# Patient Record
Sex: FEMALE | Race: OTHER | Employment: FULL TIME | URBAN - METROPOLITAN AREA
[De-identification: names, ages, dates, MRNs, and addresses within clinical notes are randomized per-mention and may not be internally consistent; named-entity substitution may affect disease eponyms.]

---

## 2017-11-14 ENCOUNTER — TELEPHONE (OUTPATIENT)
Dept: OBGYN CLINIC | Facility: CLINIC | Age: 32
End: 2017-11-14

## 2017-11-14 RX ORDER — ETONOGESTREL AND ETHINYL ESTRADIOL 11.7; 2.7 MG/1; MG/1
1 INSERT, EXTENDED RELEASE VAGINAL
Qty: 3 EACH | Refills: 5 | Status: SHIPPED | OUTPATIENT
Start: 2017-11-14 | End: 2018-11-29

## 2017-11-14 NOTE — TELEPHONE ENCOUNTER
Pt states if [facility] can prescribe another nuvaring because pt states since she stopped almost a year ago her periods have been very irregular. Please advise.

## 2018-07-17 ENCOUNTER — OFFICE VISIT (OUTPATIENT)
Dept: OBGYN CLINIC | Facility: CLINIC | Age: 33
End: 2018-07-17

## 2018-07-17 VITALS
SYSTOLIC BLOOD PRESSURE: 112 MMHG | DIASTOLIC BLOOD PRESSURE: 72 MMHG | HEIGHT: 64 IN | WEIGHT: 150.19 LBS | BODY MASS INDEX: 25.64 KG/M2

## 2018-07-17 DIAGNOSIS — Z11.3 SCREENING FOR STD (SEXUALLY TRANSMITTED DISEASE): Primary | ICD-10-CM

## 2018-07-17 DIAGNOSIS — Z01.419 ENCOUNTER FOR GYNECOLOGICAL EXAMINATION WITHOUT ABNORMAL FINDING: ICD-10-CM

## 2018-07-17 PROCEDURE — 99395 PREV VISIT EST AGE 18-39: CPT | Performed by: OBSTETRICS & GYNECOLOGY

## 2018-07-17 NOTE — PROGRESS NOTES
HPI:    Patient ID: Tiesha Parsons is a 28year old female. HPI  Patient here for routine exam.  Desires GC/Chlamydia Culture for her [facility] physician. Patient will be having IVF some time this year. LPS 2016, WNL with - HPV.       Review of Systems   Con gynecological examination without abnormal finding   F/U Labs. Encouraged monthly SBE. Discussed diet and exercise.      Orders Placed This Encounter      Chlamydia/GC PCR Combo    Meds This Visit:  No prescriptions requested or ordered in this encounter

## 2018-07-18 ENCOUNTER — TELEPHONE (OUTPATIENT)
Dept: OBGYN CLINIC | Facility: CLINIC | Age: 33
End: 2018-07-18

## 2018-07-18 LAB
C TRACH DNA SPEC QL NAA+PROBE: NEGATIVE
N GONORRHOEA DNA SPEC QL NAA+PROBE: NEGATIVE

## 2018-11-29 ENCOUNTER — TELEPHONE (OUTPATIENT)
Dept: OBGYN CLINIC | Facility: CLINIC | Age: 33
End: 2018-11-29

## 2018-11-29 RX ORDER — ETONOGESTREL AND ETHINYL ESTRADIOL 11.7; 2.7 MG/1; MG/1
1 INSERT, EXTENDED RELEASE VAGINAL
Qty: 3 EACH | Refills: 2 | Status: SHIPPED | OUTPATIENT
Start: 2018-11-29 | End: 2018-11-30

## 2018-11-29 NOTE — TELEPHONE ENCOUNTER
Pharmacy requesting a refill for pt's Nuva Ring. Pt was last seen in the office on 7/17/18.  Pls advise

## 2018-11-30 ENCOUNTER — TELEPHONE (OUTPATIENT)
Dept: OBGYN CLINIC | Facility: CLINIC | Age: 33
End: 2018-11-30

## 2018-11-30 RX ORDER — ETONOGESTREL AND ETHINYL ESTRADIOL 11.7; 2.7 MG/1; MG/1
INSERT, EXTENDED RELEASE VAGINAL
Qty: 3 EACH | Refills: 2 | OUTPATIENT
Start: 2018-11-30 | End: 2019-12-02

## 2018-11-30 NOTE — TELEPHONE ENCOUNTER
Irais called for directions on Nuvaring. Mk was advised pt is to place Nuvaring in vagina for 21 days and remove for 1 week.

## 2019-12-02 RX ORDER — ETONOGESTREL AND ETHINYL ESTRADIOL 11.7; 2.7 MG/1; MG/1
INSERT, EXTENDED RELEASE VAGINAL
Qty: 3 EACH | Refills: 2 | OUTPATIENT
Start: 2019-12-02

## 2019-12-02 RX ORDER — ETONOGESTREL AND ETHINYL ESTRADIOL 11.7; 2.7 MG/1; MG/1
INSERT, EXTENDED RELEASE VAGINAL
Qty: 3 EACH | Refills: 0 | Status: SHIPPED | OUTPATIENT
Start: 2019-12-02 | End: 2020-03-05

## 2019-12-02 NOTE — TELEPHONE ENCOUNTER
Refill request received.   RN routing to provider for consideration    BC type: Nuvaring  Pt last seen: 7/17/2018  Last pap: 9/26/16  Pap results: normal  Last refill: 11/29/18 - 9 month supply  Next appt scheduled: none

## 2020-02-26 RX ORDER — ETONOGESTREL AND ETHINYL ESTRADIOL VAGINAL .015; .12 MG/D; MG/D
RING VAGINAL
Refills: 0 | OUTPATIENT
Start: 2020-02-26

## 2020-03-05 ENCOUNTER — OFFICE VISIT (OUTPATIENT)
Dept: OBGYN CLINIC | Facility: CLINIC | Age: 35
End: 2020-03-05
Payer: COMMERCIAL

## 2020-03-05 VITALS
HEIGHT: 64 IN | BODY MASS INDEX: 25.38 KG/M2 | SYSTOLIC BLOOD PRESSURE: 114 MMHG | DIASTOLIC BLOOD PRESSURE: 76 MMHG | WEIGHT: 148.63 LBS

## 2020-03-05 DIAGNOSIS — Z01.419 ENCOUNTER FOR GYNECOLOGICAL EXAMINATION WITHOUT ABNORMAL FINDING: ICD-10-CM

## 2020-03-05 DIAGNOSIS — Z01.419 WELL WOMAN EXAM WITH ROUTINE GYNECOLOGICAL EXAM: Primary | ICD-10-CM

## 2020-03-05 PROCEDURE — 99395 PREV VISIT EST AGE 18-39: CPT | Performed by: OBSTETRICS & GYNECOLOGY

## 2020-03-05 RX ORDER — ETONOGESTREL AND ETHINYL ESTRADIOL 11.7; 2.7 MG/1; MG/1
INSERT, EXTENDED RELEASE VAGINAL
Qty: 3 RING | Refills: 3 | Status: SHIPPED | OUTPATIENT
Start: 2020-03-05 | End: 2021-02-24

## 2020-03-05 NOTE — PROGRESS NOTES
HPI:    Patient ID: Ranulfo Lainez is a 29year old female. Patient here for routine exam.  No C/Os. H/O Bilateral Salpingectomy but desires to continue with Nuvaring for cycle control. Think of going for a type of IVF.   Patient going to see [facility] to find and vitals reviewed. ASSESSMENT/PLAN:   Well woman exam with routine gynecological exam  (primary encounter diagnosis)  Encounter for gynecological examination without abnormal finding   F/U Labs. Encouraged monthly SBE.   Discussed diet and ex

## 2020-03-06 LAB — HPV I/H RISK 1 DNA SPEC QL NAA+PROBE: NEGATIVE

## 2020-11-24 ENCOUNTER — OFFICE VISIT (OUTPATIENT)
Dept: OBGYN CLINIC | Facility: CLINIC | Age: 35
End: 2020-11-24
Payer: COMMERCIAL

## 2020-11-24 VITALS — BODY MASS INDEX: 26 KG/M2 | WEIGHT: 153 LBS | DIASTOLIC BLOOD PRESSURE: 83 MMHG | SYSTOLIC BLOOD PRESSURE: 110 MMHG

## 2020-11-24 DIAGNOSIS — N64.4 MASTALGIA: Primary | ICD-10-CM

## 2020-11-24 PROCEDURE — 99213 OFFICE O/P EST LOW 20 MIN: CPT | Performed by: OBSTETRICS & GYNECOLOGY

## 2020-11-24 PROCEDURE — 3079F DIAST BP 80-89 MM HG: CPT | Performed by: OBSTETRICS & GYNECOLOGY

## 2020-11-24 PROCEDURE — 3074F SYST BP LT 130 MM HG: CPT | Performed by: OBSTETRICS & GYNECOLOGY

## 2020-11-24 NOTE — PROGRESS NOTES
HPI:   Claritza Anderson is a 29year old female who presents for a hx of right breast discomfort for 5 days. Pt does not recall any trauma/heavy lifting anytime recently. lmp approx 3 weeks ago. Pt counseled, advised to schedule dx mammogram/u/s.         Wt R dysuria, vaginal discharge or itching,periods regular   MUSCULOSKELETAL: denies back pain  NEURO: denies headaches  PSYCHE: denies depression or anxiety  HEMATOLOGIC: denies hx of anemia  ENDOCRINE: denies thyroid history  ALL/ASTHMA: denies hx of allergy

## 2020-12-04 ENCOUNTER — HOSPITAL ENCOUNTER (OUTPATIENT)
Dept: MAMMOGRAPHY | Facility: HOSPITAL | Age: 35
Discharge: HOME | End: 2020-12-04
Attending: OBSTETRICS & GYNECOLOGY
Payer: COMMERCIAL

## 2020-12-04 ENCOUNTER — HOSPITAL ENCOUNTER (OUTPATIENT)
Dept: ULTRASOUND IMAGING | Facility: HOSPITAL | Age: 35
Discharge: HOME | End: 2020-12-04
Attending: OBSTETRICS & GYNECOLOGY
Payer: COMMERCIAL

## 2020-12-04 DIAGNOSIS — N64.4 MASTALGIA: ICD-10-CM

## 2020-12-04 PROCEDURE — 77066 DX MAMMO INCL CAD BI: CPT | Performed by: OBSTETRICS & GYNECOLOGY

## 2020-12-04 PROCEDURE — 76642 ULTRASOUND BREAST LIMITED: CPT | Performed by: OBSTETRICS & GYNECOLOGY

## 2020-12-04 PROCEDURE — 77062 BREAST TOMOSYNTHESIS BI: CPT | Performed by: OBSTETRICS & GYNECOLOGY

## 2020-12-07 DIAGNOSIS — N64.4 MASTALGIA: Primary | ICD-10-CM

## 2021-02-11 ENCOUNTER — TELEPHONE (OUTPATIENT)
Dept: OBGYN CLINIC | Facility: CLINIC | Age: 36
End: 2021-02-11

## 2021-02-24 ENCOUNTER — TELEPHONE (OUTPATIENT)
Dept: OBGYN CLINIC | Facility: CLINIC | Age: 36
End: 2021-02-24

## 2021-02-24 RX ORDER — ETONOGESTREL AND ETHINYL ESTRADIOL 11.7; 2.7 MG/1; MG/1
INSERT, EXTENDED RELEASE VAGINAL
Qty: 3 RING | Refills: 0 | Status: SHIPPED | OUTPATIENT
Start: 2021-02-24 | End: 2021-05-21

## 2021-02-24 NOTE — TELEPHONE ENCOUNTER
Etonogestrel-Ethinyl Estradiol (NUVARING) 0.12-0.015 MG/24HR Vaginal Ring 3 ring 3 3/5/2020    Sig:   Place 1 ring vaginally for 3 weeks (21 days) then remove for 7.      Route:   (none)     Order #:   [id number]

## 2021-02-25 NOTE — TELEPHONE ENCOUNTER
Pharmacy is [phone] is saying that they have not received this prescription. Can we give them a verbal approval or resend? Please advise.       Etonogestrel-Ethinyl Estradiol (NUVARING) 0.12-0.015 MG/24HR Vaginal Ring

## 2021-05-21 ENCOUNTER — TELEPHONE (OUTPATIENT)
Dept: OBGYN CLINIC | Facility: CLINIC | Age: 36
End: 2021-05-21

## 2021-05-21 RX ORDER — ETONOGESTREL AND ETHINYL ESTRADIOL 11.7; 2.7 MG/1; MG/1
INSERT, EXTENDED RELEASE VAGINAL
Qty: 1 RING | Refills: 0 | Status: SHIPPED | OUTPATIENT
Start: 2021-05-21 | End: 2021-06-10

## 2021-05-21 NOTE — TELEPHONE ENCOUNTER
Patient cancelled her annual exam in March. May send in one [phone] but patient needs to schedule an appointment.

## 2021-05-21 NOTE — TELEPHONE ENCOUNTER
Pt Name and  verified. Patient informed and verbalized understanding.  Has apt scheduled for annual with [facility] on

## 2021-06-10 ENCOUNTER — OFFICE VISIT (OUTPATIENT)
Dept: OBGYN CLINIC | Facility: CLINIC | Age: 36
End: 2021-06-10
Payer: COMMERCIAL

## 2021-06-10 VITALS — DIASTOLIC BLOOD PRESSURE: 72 MMHG | WEIGHT: 157 LBS | BODY MASS INDEX: 27 KG/M2 | SYSTOLIC BLOOD PRESSURE: 126 MMHG

## 2021-06-10 DIAGNOSIS — Z01.419 ENCOUNTER FOR GYNECOLOGICAL EXAMINATION WITHOUT ABNORMAL FINDING: Primary | ICD-10-CM

## 2021-06-10 PROCEDURE — 3078F DIAST BP <80 MM HG: CPT | Performed by: OBSTETRICS & GYNECOLOGY

## 2021-06-10 PROCEDURE — 3074F SYST BP LT 130 MM HG: CPT | Performed by: OBSTETRICS & GYNECOLOGY

## 2021-06-10 PROCEDURE — 99395 PREV VISIT EST AGE 18-39: CPT | Performed by: OBSTETRICS & GYNECOLOGY

## 2021-06-10 RX ORDER — ETONOGESTREL AND ETHINYL ESTRADIOL 11.7; 2.7 MG/1; MG/1
INSERT, EXTENDED RELEASE VAGINAL
Qty: 3 RING | Refills: 3 | Status: SHIPPED | OUTPATIENT
Start: 2021-06-10

## 2021-06-10 NOTE — PROGRESS NOTES
HPI/Subjective:   Patient ID: Phil Bustos is a 28year old female. Patient here for routine exam.  No C/Os. Desires to continue with Nuvaring for cycle control. Patient does not smoke. H/O Bilateral Salpingectomy. LPS 3/2020, WNL with - HPV.   Next neck supple. Lymphadenopathy:      Cervical: No cervical adenopathy. Skin:     General: Skin is warm and dry. Neurological:      General: No focal deficit present. Mental Status: She is alert and oriented to person, place, and time.    Psychiatri

## 2022-05-18 RX ORDER — ETONOGESTREL AND ETHINYL ESTRADIOL VAGINAL .015; .12 MG/D; MG/D
RING VAGINAL
Qty: 3 RING | Refills: 0 | Status: SHIPPED | OUTPATIENT
Start: 2022-05-18 | End: 2022-06-27

## 2022-06-23 RX ORDER — ETONOGESTREL AND ETHINYL ESTRADIOL 11.7; 2.7 MG/1; MG/1
INSERT, EXTENDED RELEASE VAGINAL
Qty: 3 RING | Refills: 0 | OUTPATIENT
Start: 2022-06-23

## 2022-06-27 RX ORDER — ETONOGESTREL AND ETHINYL ESTRADIOL 11.7; 2.7 MG/1; MG/1
INSERT, EXTENDED RELEASE VAGINAL
Qty: 2 RING | Refills: 0 | Status: SHIPPED | OUTPATIENT
Start: 2022-06-27

## 2022-06-27 NOTE — TELEPHONE ENCOUNTER
Medication refilled to bridge patinet to upcoming appointment.     Future Appointments   Date Time Provider Roshni Verna   7/29/2022  9:20 AM MD MAXX Negron [facility]     No further action required

## 2022-08-25 RX ORDER — ETONOGESTREL AND ETHINYL ESTRADIOL VAGINAL .015; .12 MG/D; MG/D
RING VAGINAL
Refills: 0 | OUTPATIENT
Start: 2022-08-25

## 2022-09-14 RX ORDER — ETONOGESTREL AND ETHINYL ESTRADIOL 11.7; 2.7 MG/1; MG/1
INSERT, EXTENDED RELEASE VAGINAL
Qty: 2 RING | Refills: 0 | Status: SHIPPED | OUTPATIENT
Start: 2022-09-14

## 2022-10-19 ENCOUNTER — HOSPITAL ENCOUNTER (OUTPATIENT)
Age: 37
Discharge: HOME | End: 2022-10-19
Payer: COMMERCIAL

## 2022-10-19 VITALS
RESPIRATION RATE: 18 BRPM | HEART RATE: 101 BPM | OXYGEN SATURATION: 100 % | DIASTOLIC BLOOD PRESSURE: 98 MMHG | WEIGHT: 154 LBS | BODY MASS INDEX: 27.29 KG/M2 | SYSTOLIC BLOOD PRESSURE: 141 MMHG | TEMPERATURE: 98 F | HEIGHT: 63 IN

## 2022-10-19 DIAGNOSIS — Z20.822 ENCOUNTER FOR LABORATORY TESTING FOR COVID-19 VIRUS: Primary | ICD-10-CM

## 2022-10-19 DIAGNOSIS — J06.9 UPPER RESPIRATORY TRACT INFECTION, UNSPECIFIED TYPE: ICD-10-CM

## 2022-10-19 LAB — SARS-COV-2 RNA RESP QL NAA+PROBE: NOT DETECTED

## 2022-10-19 PROCEDURE — 99203 OFFICE O/P NEW LOW 30 MIN: CPT | Performed by: NURSE PRACTITIONER

## 2022-10-19 PROCEDURE — U0002 COVID-19 LAB TEST NON-CDC: HCPCS | Performed by: NURSE PRACTITIONER

## 2022-10-19 NOTE — ED INITIAL ASSESSMENT (HPI)
Pt reports chills, body aches, cough, nasal congestion which started Monday. Denies sore throat. Denies fevers. At home covid negative.

## 2022-11-04 ENCOUNTER — OFFICE VISIT (OUTPATIENT)
Dept: OBGYN CLINIC | Facility: CLINIC | Age: 37
End: 2022-11-04

## 2022-11-04 DIAGNOSIS — Z91.199 NO-SHOW FOR APPOINTMENT: Primary | ICD-10-CM

## 2022-11-04 PROCEDURE — 99998 NO SHOW: CPT | Performed by: OBSTETRICS & GYNECOLOGY

## 2023-01-24 ENCOUNTER — OFFICE VISIT (OUTPATIENT)
Dept: OBGYN CLINIC | Facility: CLINIC | Age: 38
End: 2023-01-24

## 2023-01-24 VITALS
BODY MASS INDEX: 27.04 KG/M2 | WEIGHT: 152.63 LBS | SYSTOLIC BLOOD PRESSURE: 141 MMHG | HEIGHT: 63 IN | DIASTOLIC BLOOD PRESSURE: 97 MMHG | HEART RATE: 59 BPM

## 2023-01-24 DIAGNOSIS — Z12.4 SCREENING FOR MALIGNANT NEOPLASM OF CERVIX: Primary | ICD-10-CM

## 2023-01-24 DIAGNOSIS — Z01.419 ENCOUNTER FOR GYNECOLOGICAL EXAMINATION WITHOUT ABNORMAL FINDING: ICD-10-CM

## 2023-01-24 PROCEDURE — 3008F BODY MASS INDEX DOCD: CPT | Performed by: OBSTETRICS & GYNECOLOGY

## 2023-01-24 PROCEDURE — 99395 PREV VISIT EST AGE 18-39: CPT | Performed by: OBSTETRICS & GYNECOLOGY

## 2023-01-24 PROCEDURE — 3077F SYST BP >= 140 MM HG: CPT | Performed by: OBSTETRICS & GYNECOLOGY

## 2023-01-24 PROCEDURE — 3080F DIAST BP >= 90 MM HG: CPT | Performed by: OBSTETRICS & GYNECOLOGY

## 2023-01-25 LAB — HPV I/H RISK 1 DNA SPEC QL NAA+PROBE: NEGATIVE

## 2024-03-22 ENCOUNTER — OFFICE VISIT (OUTPATIENT)
Dept: OBGYN CLINIC | Facility: CLINIC | Age: 39
End: 2024-03-22
Payer: COMMERCIAL

## 2024-03-22 VITALS
WEIGHT: 127 LBS | DIASTOLIC BLOOD PRESSURE: 92 MMHG | HEIGHT: 63 IN | BODY MASS INDEX: 22.5 KG/M2 | SYSTOLIC BLOOD PRESSURE: 126 MMHG

## 2024-03-22 DIAGNOSIS — Z12.4 ENCOUNTER FOR PAPANICOLAOU SMEAR FOR CERVICAL CANCER SCREENING: ICD-10-CM

## 2024-03-22 DIAGNOSIS — Z01.419 ENCOUNTER FOR GYNECOLOGICAL EXAMINATION WITHOUT ABNORMAL FINDING: Primary | ICD-10-CM

## 2024-03-22 PROCEDURE — 3008F BODY MASS INDEX DOCD: CPT | Performed by: OBSTETRICS & GYNECOLOGY

## 2024-03-22 PROCEDURE — 3080F DIAST BP >= 90 MM HG: CPT | Performed by: OBSTETRICS & GYNECOLOGY

## 2024-03-22 PROCEDURE — 99395 PREV VISIT EST AGE 18-39: CPT | Performed by: OBSTETRICS & GYNECOLOGY

## 2024-03-22 PROCEDURE — 3074F SYST BP LT 130 MM HG: CPT | Performed by: OBSTETRICS & GYNECOLOGY

## 2024-03-22 NOTE — PROGRESS NOTES
HPI:   Ana Cary is a 38 year old female who presents for an annual/pap.       Wt Readings from Last 6 Encounters:   24 127 lb (57.6 kg)   23 157 lb 6.4 oz (71.4 kg)   23 152 lb 9.6 oz (69.2 kg)   10/19/22 154 lb (69.9 kg)   06/10/21 157 lb (71.2 kg)   20 153 lb (69.4 kg)     Body mass index is 22.5 kg/m².     CHOLESTEROL, TOTAL (mg/dL)   Date Value   2023 209 (H)   2020 214 (H)     HDL CHOLESTEROL (mg/dL)   Date Value   2023 77   2020 83     LDL-CHOLESTEROL (mg/dL (calc))   Date Value   2023 107 (H)   2020 99     AST (U/L)   Date Value   2023 18   2020 15     ALT (U/L)   Date Value   2023 16   2020 11        Current Outpatient Medications   Medication Sig Dispense Refill    ELURYNG 0.12-0.015 MG/24HR Vaginal Ring INSERT 1 RING VAGINALLY FOR 3 WEEKS(21 DAYS), THEN REMOVE FOR 7 DAYS 3 each 3      Past Medical History:   Diagnosis Date    Extrinsic asthma, unspecified       Past Surgical History:   Procedure Laterality Date      ,       Family History   Problem Relation Age of Onset    Diabetes Father     High Cholesterol Father     Hypertension Other         grandmother      Social History:   Social History     Socioeconomic History    Marital status:    Tobacco Use    Smoking status: Never    Smokeless tobacco: Never   Substance and Sexual Activity    Alcohol use: Yes     Comment: occasionally    Drug use: No   Other Topics Concern    Caffeine Concern Yes     Comment: coffee, 1 cup/day            REVIEW OF SYSTEMS:   GENERAL: feels well otherwise  SKIN: denies any unusual skin lesions  EYES:denies blurred vision or double vision  HEENT: denies nasal congestion, sinus pain or ST  LUNGS: denies shortness of breath with exertion  CARDIOVASCULAR: denies chest pain on exertion  GI: denies abdominal pain,denies heartburn  : denies dysuria, vaginal discharge or itching,periods regular   MUSCULOSKELETAL: denies back  pain  NEURO: denies headaches  PSYCHE: denies depression or anxiety  HEMATOLOGIC: denies hx of anemia  ENDOCRINE: denies thyroid history  ALL/ASTHMA: denies hx of allergy or asthma    EXAM:   BP (!) 126/92   Ht 5' 3\" (1.6 m)   Wt 127 lb (57.6 kg)   LMP 03/10/2024   Breastfeeding No   BMI 22.50 kg/m²   Body mass index is 22.5 kg/m².  Repeat bp pending.   If repeat elevated, will need to stop birth control ring  GENERAL: well developed, well nourished,in no apparent distress  SKIN: no rashes,no suspicious lesions  HEENT: atraumatic, normocephalic  EYES:normal in appearance  NECK: supple,no adenopathy  CHEST: no chest tenderness  BREAST: no dominant or suspicious mass  LUNGS: clear to auscultation  CARDIO: RRR without murmur  GI: good BS's,no masses, HSM or tenderness  :introitus is normal,scant discharge,cervix is pink,no adnexal masses or tenderness, PAP was done     MUSCULOSKELETAL: back is not tender,FROM of the back  EXTREMITIES: no cyanosis, clubbing or edema  NEURO: Oriented times three      ASSESSMENT AND PLAN:   Ana Cary is a 38 year old female who presents for an annual/pap   . Self breast exam explained. Health maintenance. Body mass index is 22.5 kg/m²., recommended low fat diet and aerobic exercise 30 minutes three times weekly.  The patient indicates understanding of these issues and agrees to the plan.  The patient is asked to return for an annual visit.

## 2024-03-24 RX ORDER — ETONOGESTREL AND ETHINYL ESTRADIOL VAGINAL RING .015; .12 MG/D; MG/D
1 RING VAGINAL
Qty: 3 EACH | Refills: 3 | Status: SHIPPED | OUTPATIENT
Start: 2024-03-24 | End: 2024-03-24

## 2024-03-25 LAB — HPV I/H RISK 1 DNA SPEC QL NAA+PROBE: NEGATIVE

## 2024-03-26 ENCOUNTER — TELEPHONE (OUTPATIENT)
Dept: OBGYN CLINIC | Facility: CLINIC | Age: 39
End: 2024-03-26

## 2024-03-26 NOTE — TELEPHONE ENCOUNTER
Call pt to recheck bp in office , pt is requesting nuvaring,  but do not recommend nuvaring with hypertension/elevated bp.

## 2024-03-26 NOTE — TELEPHONE ENCOUNTER
Patient verified name and     Patient informed of recommendations. Currently out of town but requesting appt next week. Scheduled  for BP check. Aware of scheduling details.

## 2024-04-19 ENCOUNTER — TELEPHONE (OUTPATIENT)
Dept: OBGYN CLINIC | Facility: CLINIC | Age: 39
End: 2024-04-19

## 2024-04-19 NOTE — TELEPHONE ENCOUNTER
----- Message from Aditya Hilliard MD sent at 4/19/2024 11:55 AM CDT -----  Need repeat pap smear

## 2024-04-19 NOTE — TELEPHONE ENCOUNTER
Called pt to inform pap was unsatisfactory not enough cells to run and need to repeat pap smear in office.   Left vm

## 2024-11-18 ENCOUNTER — OFFICE VISIT (OUTPATIENT)
Dept: ALLERGY | Facility: CLINIC | Age: 39
End: 2024-11-18

## 2024-11-18 ENCOUNTER — NURSE ONLY (OUTPATIENT)
Dept: ALLERGY | Facility: CLINIC | Age: 39
End: 2024-11-18

## 2024-11-18 DIAGNOSIS — T48.5X5A RHINITIS MEDICAMENTOSA: ICD-10-CM

## 2024-11-18 DIAGNOSIS — J30.2 SEASONAL AND PERENNIAL ALLERGIC RHINOCONJUNCTIVITIS: Primary | ICD-10-CM

## 2024-11-18 DIAGNOSIS — Z23 FLU VACCINE NEED: Primary | ICD-10-CM

## 2024-11-18 DIAGNOSIS — H10.10 SEASONAL AND PERENNIAL ALLERGIC RHINOCONJUNCTIVITIS: Primary | ICD-10-CM

## 2024-11-18 DIAGNOSIS — J30.2 SEASONAL AND PERENNIAL ALLERGIC RHINOCONJUNCTIVITIS: ICD-10-CM

## 2024-11-18 DIAGNOSIS — Z23 NEED FOR COVID-19 VACCINE: ICD-10-CM

## 2024-11-18 DIAGNOSIS — J30.89 SEASONAL AND PERENNIAL ALLERGIC RHINOCONJUNCTIVITIS: ICD-10-CM

## 2024-11-18 DIAGNOSIS — J30.89 SEASONAL AND PERENNIAL ALLERGIC RHINOCONJUNCTIVITIS: Primary | ICD-10-CM

## 2024-11-18 DIAGNOSIS — H10.10 SEASONAL AND PERENNIAL ALLERGIC RHINOCONJUNCTIVITIS: ICD-10-CM

## 2024-11-18 DIAGNOSIS — J31.0 RHINITIS MEDICAMENTOSA: ICD-10-CM

## 2024-11-18 PROCEDURE — 95024 IQ TESTS W/ALLERGENIC XTRCS: CPT | Performed by: ALLERGY & IMMUNOLOGY

## 2024-11-18 PROCEDURE — 95004 PERQ TESTS W/ALRGNC XTRCS: CPT | Performed by: ALLERGY & IMMUNOLOGY

## 2024-11-18 RX ORDER — AZELASTINE 1 MG/ML
2 SPRAY, METERED NASAL DAILY
Qty: 3 EACH | Refills: 0 | Status: SHIPPED | OUTPATIENT
Start: 2024-11-18

## 2024-11-18 RX ORDER — PREDNISONE 10 MG/1
TABLET ORAL
Qty: 15 TABLET | Refills: 0 | Status: SHIPPED | OUTPATIENT
Start: 2024-11-18

## 2024-11-18 RX ORDER — ETONOGESTREL/ETHINYL ESTRADIOL .12-.015MG
1 RING, VAGINAL VAGINAL
COMMUNITY
Start: 2024-06-30

## 2024-11-18 NOTE — PROGRESS NOTES
Ana Cary is a 38 year old female.    HPI:     Chief Complaint   Patient presents with    Allergies     Worsening allergy symptoms over this past year. Symptoms include nasal congestion, mouth breathing, dry throat. Symptoms are worse in the summer. No antihistamines within the last 5 days       Patient is a 38-year-old female who presents for allergy evaluation with a chief complaint of allergies    Immunizations reviewed.  COVID-vaccine x 2 doses last in 2021  No flu vaccine on record      Today patient reports       Allergies   Duration: this past year   Timing: Worsens seasonally.  Especially the summer  Symptoms:   Nasal congestion mouth breathing dry throat, runny nose sz   Severity: Moderate  Status: No change to worsening  Triggers: Allergies  Tried: afrin (active),  zyrtec prn , zyrtec-d , flonase prn   Pets : 1dog., 1 cat  Nonsmoker      Hx of asthma, ad, or food allergy:    History of asthma as child .  Denies current symptoms.  Denies symptoms more than 2 days/week  No ED visits or prednisone over the past year  Albuterol as needed nasal congestion mouth breathing dry throat    Defers flu  vaccine      HISTORY:  Past Medical History:    Extrinsic asthma, unspecified      Past Surgical History:   Procedure Laterality Date      ,       Family History   Problem Relation Age of Onset    Diabetes Father     High Cholesterol Father     Hypertension Other         grandmother      Social History:   Social History     Socioeconomic History    Marital status:    Tobacco Use    Smoking status: Never    Smokeless tobacco: Never   Vaping Use    Vaping status: Never Used   Substance and Sexual Activity    Alcohol use: Yes     Comment: occasionally    Drug use: No   Other Topics Concern    Caffeine Concern Yes     Comment: coffee, 1 cup/day        Medications (Active prior to today's visit):  Current Outpatient Medications   Medication Sig Dispense Refill    NUVARING 0.12-0.015 MG/24HR  Vaginal Ring Place 1 Ring vaginally every 28 days.      predniSONE 10 MG Oral Tab Take 3 tabs(30 mg) with food once a day x 5 days 15 tablet 0    azelastine 0.1 % Nasal Solution 2 sprays by Nasal route daily. 3 each 0       Allergies:  Allergies[1]      ROS:     Allergic/Immuno:  See HPI  Cardiovascular:  Negative for irregular heartbeat/palpitations, chest pain, edema  Constitutional:  Negative night sweats,weight loss, irritability and lethargy  Endocrine:  Negative for cold intolerance, polydipsia and polyphagia  ENMT:  Negative for ear drainage, hearing loss  see hpi   Eyes:  Negative for eye discharge and vision loss  Gastrointestinal:  Negative for abdominal pain,   Genitourinary:  Negative for dysuria and hematuria  Hema/Lymph:  Negative for easy bleeding and easy bruising  Integumentary:  Negative for pruritus and rash  Musculoskeletal:  Negative for joint symptoms  Neurological:  Negative for dizziness, seizures  Psychiatric:  Negative for inappropriate interaction and psychiatric symptoms  Respiratory:  Negative for cough, dyspnea and wheezing      PHYSICAL EXAM:   Constitutional: responsive, no acute distress noted  Head/Face: NC/Atraumatic  Eyes/Vision: conjunctiva and lids are normal extraocular motion is intact   Ears/Audiometry: tympanic membranes are normal bilaterally hearing is grossly intact  Nose/Mouth/Throat: nose and throat are clear mucous membranes are moist   Neck/Thyroid: neck is supple without adenopathy  Lymphatic: no abnormal cervical, supraclavicular or axillary adenopathy is noted  Respiratory: normal to inspection lungs are clear to auscultation bilaterally normal respiratory effort   Cardiovascular: regular rate and rhythm no murmurs, gallups, or rubs  Abdomen: soft non-tender non-distended  Skin/Hair: no unusual rashes present  Extremities: no edema, cyanosis, or clubbing  Neurological:Oriented to time, place, person & situation       ASSESSMENT/PLAN:   Assessment   Encounter  Diagnoses   Name Primary?    Flu vaccine need Yes    Need for COVID-19 vaccine     Seasonal and perennial allergic rhinoconjunctivitis     Rhinitis medicamentosa        Skin testing today to common indoor and outdoor environmental allergy was + to grass, mod, dm , cat dog cr, weeds  Positive histamine control    #1 allergic rhinitis  See above skin testing to screen for allergic triggers  Reviewed avoidance measures and potential treatment option immunotherapy  Trial of Astelin nasal spray 2 sprays per nostril up to twice a day as needed for symptoms.  May add Singulair or montelukast if congestion persist  Reviewed to quit Afrin cold turkey as she does have a component of rhinitis medicamentosa.    #2 rhinitis medicamentosa.  Chronic Afrin use is.  Recommend to quit cold turkey.  Start prednisone 30 mg once a day with food x 5 days  Astelin nasal spray as an antihistamine nasal spray 2 sprays per nostril twice a day.  May add Zyrtec-D as needed  Consider Singulair if refractory    #3 flu vaccine recommended and offered    4.  COVID-vaccine booster recommended.  Please check with local pharmacy as we do not stock the vaccine.  Most recent booster since 2024         Orders This Visit:  No orders of the defined types were placed in this encounter.      Meds This Visit:  Requested Prescriptions     Signed Prescriptions Disp Refills    predniSONE 10 MG Oral Tab 15 tablet 0     Sig: Take 3 tabs(30 mg) with food once a day x 5 days    azelastine 0.1 % Nasal Solution 3 each 0     Si sprays by Nasal route daily.       Imaging & Referrals:  None     2024  Sam Maldonado MD      If medication samples were provided today, they were provided solely for patient education and training related to self administration of these medications.  Teaching, instruction and sample was provided to the patient by myself.  Teaching included  a review of potential adverse side effects as well as potential efficacy.   Patient's questions were answered in regards to medication administration and dosing and potential side effects. Teaching was provided via the teach back method         [1] No Known Allergies

## 2024-11-18 NOTE — PATIENT INSTRUCTIONS
#1 allergic rhinitis  See above skin testing to screen for allergic triggers  Reviewed avoidance measures and potential treatment option immunotherapy  Trial of Astelin nasal spray 2 sprays per nostril up to twice a day as needed for symptoms.  May add Singulair or montelukast if congestion persist  Reviewed to quit Afrin cold turkey as she does have a component of rhinitis medicamentosa.    #2 rhinitis medicamentosa.  Chronic Afrin use is.  Recommend to quit cold turkey.  Start prednisone 30 mg once a day with food x 5 days  Astelin nasal spray as an antihistamine nasal spray 2 sprays per nostril twice a day.  May add Zyrtec-D as needed  Consider Singulair if refractory    #3 flu vaccine recommended and offered    4.  COVID-vaccine booster recommended.  Please check with local pharmacy as we do not stock the vaccine.  Most recent booster since 2024         Orders This Visit:  No orders of the defined types were placed in this encounter.      Meds This Visit:  Requested Prescriptions     Signed Prescriptions Disp Refills    predniSONE 10 MG Oral Tab 15 tablet 0     Sig: Take 3 tabs(30 mg) with food once a day x 5 days    azelastine 0.1 % Nasal Solution 3 each 0     Si sprays by Nasal route daily.

## 2024-12-12 ENCOUNTER — HOSPITAL ENCOUNTER (OUTPATIENT)
Age: 39
Discharge: HOME | End: 2024-12-12
Payer: COMMERCIAL

## 2024-12-12 VITALS
OXYGEN SATURATION: 100 % | DIASTOLIC BLOOD PRESSURE: 92 MMHG | HEART RATE: 75 BPM | TEMPERATURE: 98 F | RESPIRATION RATE: 18 BRPM | SYSTOLIC BLOOD PRESSURE: 149 MMHG

## 2024-12-12 DIAGNOSIS — J02.9 VIRAL PHARYNGITIS: Primary | ICD-10-CM

## 2024-12-12 LAB — S PYO AG THROAT QL: NEGATIVE

## 2024-12-12 PROCEDURE — 87081 CULTURE SCREEN ONLY: CPT

## 2024-12-12 NOTE — DISCHARGE INSTRUCTIONS
Salt water gargles.  Push fluids.  Rest.  Tylenol or ibuprofen as needed for pain or fever.  Follow-up as needed with your doctor if your symptoms persist or worsen.  Return for any concerns.

## 2024-12-12 NOTE — ED PROVIDER NOTES
He    Patient Seen in: [facility]      History     Chief Complaint   Patient presents with    Sore Throat     Stated Complaint: Sore Throat  Subjective:   39-year-old healthy female presents for 3 days of a sore throat.  She has had some bodyaches.  No difficulty swallowing.  Speech is clear.  No cough or congestion.  No fevers or chills.  No known exposure to sick contacts.  She is eating and drinking normally without vomiting or diarrhea.  No neck stiffness.  No rashes.  No headaches.  No dizziness.  She appears well and nontoxic.      Objective:   Past Medical History:    Extrinsic asthma, unspecified            Past Surgical History:   Procedure Laterality Date      ,               Social History     Socioeconomic History    Marital status:    Tobacco Use    Smoking status: Never    Smokeless tobacco: Never   Vaping Use    Vaping status: Never Used   Substance and Sexual Activity    Alcohol use: Yes     Comment: occasionally    Drug use: No   Other Topics Concern    Caffeine Concern Yes     Comment: coffee, 1 cup/day            Review of Systems    Positive for stated complaint: Sore Throat     Other systems are as noted in HPI.  Constitutional and vital signs reviewed.      All other systems reviewed and negative except as noted above.    Physical Exam     ED Triage Vitals [24 1603]   BP (!) 149/92   Pulse 75   Resp 18   Temp 97.9 °F (36.6 °C)   Temp src Oral   SpO2 100 %   O2 Device None (Room air)     Current:BP (!) 149/92   Pulse 75   Temp 97.9 °F (36.6 °C) (Oral)   Resp 18   LMP 03/10/2024   SpO2 100%     Physical Exam  Vitals and nursing note reviewed.   Constitutional:       General: She is not in acute distress.     Appearance: She is well-developed. She is not toxic-appearing.   HENT:      Head: Normocephalic.      Right Ear: Tympanic membrane normal.      Left Ear: Tympanic membrane normal.      Nose: No congestion or rhinorrhea.      Mouth/Throat:      Mouth:  Mucous membranes are moist. No oral lesions.      Pharynx: Oropharynx is clear. Uvula midline. Posterior oropharyngeal erythema present. No pharyngeal swelling, oropharyngeal exudate or uvula swelling.      Tonsils: No tonsillar exudate.   Eyes:      Conjunctiva/sclera: Conjunctivae normal.   Cardiovascular:      Rate and Rhythm: Normal rate and regular rhythm.   Pulmonary:      Effort: Pulmonary effort is normal.      Breath sounds: Normal breath sounds.   Musculoskeletal:      Cervical back: Normal range of motion and neck supple.   Lymphadenopathy:      Cervical: No cervical adenopathy.   Skin:     General: Skin is warm and dry.      Capillary Refill: Capillary refill takes less than 2 seconds.      Findings: No rash.   Neurological:      General: No focal deficit present.      Mental Status: She is alert and oriented to person, place, and time.   Psychiatric:         Mood and Affect: Mood normal.         Behavior: Behavior normal.         ED Course   No results found.  Labs Reviewed   POCT RAPID STREP - Normal   GRP A STREP CULT, THROAT       MDM     Medical Decision Making  The strep test is negative.  The patient is aware.  Her symptoms are most likely viral.  We discussed supportive care including salt water gargles, pushing fluids, rest, and Tylenol or Motrin as needed for pain or fever.  She will follow-up as needed with her primary care doctor if her symptoms persist or worsen.    Amount and/or Complexity of Data Reviewed  Labs: ordered.     Details: Strep negative    Risk  OTC drugs.  Risk Details: Strep throat versus viral pharyngitis        Disposition and Plan     Clinical Impression:  1. Viral pharyngitis         Disposition:  Discharge  12/12/2024  4:37 pm    Follow-up:  John Esparza MD  [address]  [place] [address]  [phone]    Schedule an appointment as soon as possible for a visit   As needed          Medications Prescribed:  Current Discharge Medication List

## 2024-12-12 NOTE — ED INITIAL ASSESSMENT (HPI)
Pt complaining of sore throat since Monday with body aches and congestion. No fever. No cough. Negative covid test at home. No known sick contacts,

## 2025-04-28 ENCOUNTER — TELEPHONE (OUTPATIENT)
Dept: OBGYN CLINIC | Facility: CLINIC | Age: 40
End: 2025-04-28

## 2025-04-29 ENCOUNTER — OFFICE VISIT (OUTPATIENT)
Dept: OBGYN CLINIC | Facility: CLINIC | Age: 40
End: 2025-04-29
Payer: COMMERCIAL

## 2025-04-29 VITALS
DIASTOLIC BLOOD PRESSURE: 87 MMHG | HEIGHT: 63 IN | SYSTOLIC BLOOD PRESSURE: 122 MMHG | BODY MASS INDEX: 23.04 KG/M2 | WEIGHT: 130 LBS

## 2025-04-29 DIAGNOSIS — Z01.419 ENCOUNTER FOR GYNECOLOGICAL EXAMINATION WITHOUT ABNORMAL FINDING: Primary | ICD-10-CM

## 2025-04-29 PROCEDURE — 3079F DIAST BP 80-89 MM HG: CPT | Performed by: OBSTETRICS & GYNECOLOGY

## 2025-04-29 PROCEDURE — 3074F SYST BP LT 130 MM HG: CPT | Performed by: OBSTETRICS & GYNECOLOGY

## 2025-04-29 PROCEDURE — 99395 PREV VISIT EST AGE 18-39: CPT | Performed by: OBSTETRICS & GYNECOLOGY

## 2025-04-29 PROCEDURE — 3008F BODY MASS INDEX DOCD: CPT | Performed by: OBSTETRICS & GYNECOLOGY

## 2025-04-29 RX ORDER — ETONOGESTREL/ETHINYL ESTRADIOL .12-.015MG
1 RING, VAGINAL VAGINAL
Qty: 3 RING | Refills: 5 | Status: SHIPPED | OUTPATIENT
Start: 2025-04-29

## 2025-04-29 NOTE — PROGRESS NOTES
HPI:   Ana Cary is a 39 year old female who presents for an annual/pap.     Wt Readings from Last 6 Encounters:   04/29/25 130 lb (59 kg)   03/22/24 127 lb (57.6 kg)   01/27/23 157 lb 6.4 oz (71.4 kg)   01/24/23 152 lb 9.6 oz (69.2 kg)   10/19/22 154 lb (69.9 kg)   06/10/21 157 lb (71.2 kg)     Body mass index is 23.03 kg/m².    CHOLESTEROL, TOTAL (mg/dL)   Date Value   01/27/2023 209 (H)   08/31/2020 214 (H)     HDL CHOLESTEROL (mg/dL)   Date Value   01/27/2023 77   08/31/2020 83     LDL-CHOLESTEROL (mg/dL (calc))   Date Value   01/27/2023 107 (H)   08/31/2020 99     AST (U/L)   Date Value   01/27/2023 18   08/31/2020 15     ALT (U/L)   Date Value   01/27/2023 16   08/31/2020 11        Current Medications[1]   Past Medical History[2]   Past Surgical History[3]   Family History[4]   Social History:   Short Social Hx on File[5]         REVIEW OF SYSTEMS:   GENERAL: feels well otherwise  SKIN: denies any unusual skin lesions  EYES:denies blurred vision or double vision  HEENT: denies nasal congestion, sinus pain or ST  LUNGS: denies shortness of breath with exertion  CARDIOVASCULAR: denies chest pain on exertion  GI: denies abdominal pain,denies heartburn  : denies dysuria, vaginal discharge or itching,periods regular   MUSCULOSKELETAL: denies back pain  NEURO: denies headaches  PSYCHE: denies depression or anxiety  HEMATOLOGIC: denies hx of anemia  ENDOCRINE: denies thyroid history  ALL/ASTHMA: denies hx of allergy or asthma    EXAM:   /87   Ht 5' 3\" (1.6 m)   Wt 130 lb (59 kg)   LMP 04/27/2025 (Exact Date)   BMI 23.03 kg/m²   Body mass index is 23.03 kg/m².   GENERAL: well developed, well nourished,in no apparent distress  SKIN: no rashes,no suspicious lesions  HEENT: atraumatic, normocephalic  EYES:normal in appearance  NECK: supple,no adenopathy  CHEST: no chest tenderness  BREAST: no dominant or suspicious mass  LUNGS: clear to auscultation  CARDIO: RRR without murmur  GI: good BS's,no masses, HSM  or tenderness  :introitus is normal,scant discharge,cervix is pink,no adnexal masses or tenderness, PAP was done     MUSCULOSKELETAL: back is not tender,FROM of the back  EXTREMITIES: no cyanosis, clubbing or edema  NEURO: Oriented times three      ASSESSMENT AND PLAN:   Ana Cary is a 39 year old female who presents for an annual/pap.   . Self breast exam explained. Health maintenance. Body mass index is 23.03 kg/m²., recommended low fat diet and aerobic exercise 30 minutes three times weekly.  The patient indicates understanding of these issues and agrees to the plan.  The patient is asked to return for an annual visit.           [1]   Current Outpatient Medications   Medication Sig Dispense Refill    NUVARING 0.12-0.015 MG/24HR Vaginal Ring Place 1 Ring vaginally every 28 days.      predniSONE 10 MG Oral Tab Take 3 tabs(30 mg) with food once a day x 5 days 15 tablet 0    azelastine 0.1 % Nasal Solution 2 sprays by Nasal route daily. 3 each 0   [2]   Past Medical History:   Extrinsic asthma, unspecified   [3]   Past Surgical History:  Procedure Laterality Date      ,    [4]   Family History  Problem Relation Age of Onset    Diabetes Father     High Cholesterol Father     Hypertension Other         grandmother   [5]   Social History  Socioeconomic History    Marital status:    Tobacco Use    Smoking status: Never     Passive exposure: Never    Smokeless tobacco: Never   Vaping Use    Vaping status: Never Used   Substance and Sexual Activity    Alcohol use: Yes     Comment: occasionally    Drug use: No   Other Topics Concern    Caffeine Concern Yes     Comment: coffee, 1 cup/day

## 2025-04-30 LAB — HPV E6+E7 MRNA CVX QL NAA+PROBE: NEGATIVE

## 2025-05-12 ENCOUNTER — E-VISIT (OUTPATIENT)
Dept: TELEHEALTH | Age: 40
End: 2025-05-12
Payer: COMMERCIAL

## 2025-05-12 DIAGNOSIS — R09.81 NASAL CONGESTION: Primary | ICD-10-CM

## 2025-05-12 NOTE — PROGRESS NOTES
HPI:  Ana Cary is a 39 year old female who presents for an evisit.  See e-visit questionnaire submission and messages.    Current Medications[1]  Past Medical History[2]  Past Surgical History[3]    Short Social Hx on File[4]       No results found for this or any previous visit (from the past 24 hours).    ASSESSMENT AND PLAN:      ASSESSMENT:   Encounter Diagnosis   Name Primary?    Nasal congestion Yes       PLAN: Meds as below.  See patient Instructions and visit messages.   -Total of 10 minutes spent with patient.    Meds & Refills for this Visit:  Requested Prescriptions      No prescriptions requested or ordered in this encounter       Risks, benefits, and side effects of medication explained and discussed.    There are no Patient Instructions on file for this visit.    The patient indicates understanding of these issues and agrees to the plan.  See attached patient references.  The patient is asked to return if sx's persist or worsen.    Ana Cary understands evisit evaluation is not a substitute for face-to-face examination or emergency care. Patient advised to go to ER or call 911 for worsening symptoms or acute distress.         [1]   Current Outpatient Medications   Medication Sig Dispense Refill    NUVARING 0.12-0.015 MG/24HR Vaginal Ring Place 1 Ring vaginally every 28 days. 3 Ring 5    predniSONE 10 MG Oral Tab Take 3 tabs(30 mg) with food once a day x 5 days 15 tablet 0    azelastine 0.1 % Nasal Solution 2 sprays by Nasal route daily. 3 each 0   [2]   Past Medical History:   Extrinsic asthma, unspecified   [3]   Past Surgical History:  Procedure Laterality Date      ,    [4]   Social History  Socioeconomic History    Marital status:    Tobacco Use    Smoking status: Never     Passive exposure: Never    Smokeless tobacco: Never   Vaping Use    Vaping status: Never Used   Substance and Sexual Activity    Alcohol use: Yes     Comment: occasionally    Drug use: No   Other  Topics Concern    Caffeine Concern Yes     Comment: coffee, 1 cup/day

## 2025-05-23 RX ORDER — PREDNISONE 10 MG/1
TABLET ORAL
Qty: 15 TABLET | Refills: 0 | OUTPATIENT
Start: 2025-05-23

## 2025-05-23 NOTE — TELEPHONE ENCOUNTER
Left a message for patient to please contact our office to discuss refill, per Dr. Maldonado's patients will need an appointment for refill of Prednisone.  
RN left 3rd message for pt advising that if she is still in need or Prednisone to follow up in Urgent Care or ER if she is experiencing any difficulties breathing, talking or swallowing.    Advised that Dr. Maldonado is out of the office until Tuesday with the holiday, however a nurse is answering messages until 12pm today.    Provided call back number and office hours.    
Sent [employer] message to patient to please contact Dr. Maldonado's office to discuss refill request for Prednisone. Will need to assess patient. Any problems breathing or swallowing, any facial or mouth swelling to go to urgent care or ER.    Last office was 11/18/24 for allergies.  
mammogram

## (undated) NOTE — LETTER
4/30/2024          Ana Cary        [address] [place] [address]         Dear Ana,    This letter is to inform you that our office has made several attempts to reach you by phone without success.      Please contact our office at the number listed below as soon as you receive this letter to discuss this issue and to make the necessary changes in our system to your contact information.  Thank you for your cooperation.        Sincerely,    Aditya Hilliard MD  [address] [place] [id number] [phone]

## (undated) NOTE — LETTER
3/10/2020              Harvey Villasenor        [address]         Dear Jv Mckeon,    It was a pleasure to see you. Your PAP test was normal.  There is no need for further testing at this time.   I look forward to seeing you at y